# Patient Record
Sex: MALE | Race: WHITE | NOT HISPANIC OR LATINO
[De-identification: names, ages, dates, MRNs, and addresses within clinical notes are randomized per-mention and may not be internally consistent; named-entity substitution may affect disease eponyms.]

---

## 2017-04-12 ENCOUNTER — RESULT REVIEW (OUTPATIENT)
Age: 72
End: 2017-04-12

## 2017-04-13 ENCOUNTER — APPOINTMENT (OUTPATIENT)
Dept: DERMATOLOGY | Facility: CLINIC | Age: 72
End: 2017-04-13

## 2017-10-24 ENCOUNTER — APPOINTMENT (OUTPATIENT)
Dept: DERMATOLOGY | Facility: CLINIC | Age: 72
End: 2017-10-24
Payer: MEDICARE

## 2017-10-24 PROCEDURE — 99214 OFFICE O/P EST MOD 30 MIN: CPT | Mod: 25

## 2017-10-24 PROCEDURE — 17000 DESTRUCT PREMALG LESION: CPT

## 2017-12-27 ENCOUNTER — APPOINTMENT (OUTPATIENT)
Dept: GASTROENTEROLOGY | Facility: CLINIC | Age: 72
End: 2017-12-27
Payer: MEDICARE

## 2017-12-27 VITALS
OXYGEN SATURATION: 98 % | RESPIRATION RATE: 16 BRPM | HEART RATE: 81 BPM | DIASTOLIC BLOOD PRESSURE: 97 MMHG | BODY MASS INDEX: 24.92 KG/M2 | SYSTOLIC BLOOD PRESSURE: 157 MMHG | WEIGHT: 178 LBS | HEIGHT: 71 IN

## 2017-12-27 DIAGNOSIS — Z80.0 FAMILY HISTORY OF MALIGNANT NEOPLASM OF DIGESTIVE ORGANS: ICD-10-CM

## 2017-12-27 DIAGNOSIS — K57.30 DIVERTICULOSIS OF LARGE INTESTINE W/OUT PERFORATION OR ABSCESS W/OUT BLEEDING: ICD-10-CM

## 2017-12-27 PROCEDURE — 99203 OFFICE O/P NEW LOW 30 MIN: CPT

## 2017-12-27 RX ORDER — CICLOPIROX 80 MG/ML
8 SOLUTION TOPICAL
Qty: 7 | Refills: 0 | Status: ACTIVE | COMMUNITY
Start: 2017-10-17

## 2017-12-27 RX ORDER — ASPIRIN 325 MG/1
325 TABLET, FILM COATED ORAL
Refills: 0 | Status: ACTIVE | COMMUNITY

## 2017-12-27 RX ORDER — FIBER
TABLET ORAL
Refills: 0 | Status: ACTIVE | COMMUNITY

## 2017-12-27 RX ORDER — SODIUM SULFATE, POTASSIUM SULFATE, MAGNESIUM SULFATE 17.5; 3.13; 1.6 G/ML; G/ML; G/ML
17.5-3.13-1.6 SOLUTION, CONCENTRATE ORAL
Qty: 1 | Refills: 0 | Status: ACTIVE | COMMUNITY
Start: 2017-12-27 | End: 1900-01-01

## 2018-03-30 ENCOUNTER — OUTPATIENT (OUTPATIENT)
Dept: OUTPATIENT SERVICES | Facility: HOSPITAL | Age: 73
LOS: 1 days | Discharge: HOME | End: 2018-03-30
Payer: MEDICARE

## 2018-03-30 ENCOUNTER — APPOINTMENT (OUTPATIENT)
Dept: GASTROENTEROLOGY | Facility: GI CENTER | Age: 73
End: 2018-03-30
Payer: MEDICARE

## 2018-03-30 ENCOUNTER — RESULT REVIEW (OUTPATIENT)
Age: 73
End: 2018-03-30

## 2018-03-30 DIAGNOSIS — Z86.010 PERSONAL HISTORY OF COLONIC POLYPS: ICD-10-CM

## 2018-03-30 DIAGNOSIS — K57.30 DIVERTICULOSIS OF LARGE INTESTINE W/OUT PERFORATION OR ABSCESS W/OUT BLEEDING: ICD-10-CM

## 2018-03-30 DIAGNOSIS — K57.30 DIVERTICULOSIS OF LARGE INTESTINE WITHOUT PERFORATION OR ABSCESS WITHOUT BLEEDING: ICD-10-CM

## 2018-03-30 DIAGNOSIS — D12.2 BENIGN NEOPLASM OF ASCENDING COLON: ICD-10-CM

## 2018-03-30 DIAGNOSIS — Z85.828 PERSONAL HISTORY OF OTHER MALIGNANT NEOPLASM OF SKIN: ICD-10-CM

## 2018-03-30 DIAGNOSIS — D12.4 BENIGN NEOPLASM OF DESCENDING COLON: ICD-10-CM

## 2018-03-30 PROCEDURE — 45380 COLONOSCOPY AND BIOPSY: CPT | Mod: 59

## 2018-03-30 PROCEDURE — 45385 COLONOSCOPY W/LESION REMOVAL: CPT

## 2018-03-30 PROCEDURE — 45385 COLONOSCOPY W/LESION REMOVAL: CPT | Mod: XS,PT

## 2018-03-30 PROCEDURE — 88305 TISSUE EXAM BY PATHOLOGIST: CPT

## 2018-03-30 PROCEDURE — 45380 COLONOSCOPY AND BIOPSY: CPT | Mod: XS,PT

## 2018-03-30 PROCEDURE — 45388 COLONOSCOPY W/ABLATION: CPT | Mod: PT

## 2018-03-30 PROCEDURE — 88305 TISSUE EXAM BY PATHOLOGIST: CPT | Mod: 26

## 2018-04-03 LAB — SURGICAL PATHOLOGY FINAL REPORT - CH: SIGNIFICANT CHANGE UP

## 2018-04-24 ENCOUNTER — APPOINTMENT (OUTPATIENT)
Dept: DERMATOLOGY | Facility: CLINIC | Age: 73
End: 2018-04-24
Payer: MEDICARE

## 2018-04-24 PROCEDURE — 99214 OFFICE O/P EST MOD 30 MIN: CPT

## 2018-04-25 ENCOUNTER — RESULT REVIEW (OUTPATIENT)
Age: 73
End: 2018-04-25

## 2018-07-27 PROBLEM — Z80.0 FAMILY HISTORY OF COLON CANCER: Status: INACTIVE | Noted: 2017-12-27

## 2018-10-18 ENCOUNTER — APPOINTMENT (OUTPATIENT)
Dept: DERMATOLOGY | Facility: CLINIC | Age: 73
End: 2018-10-18
Payer: MEDICARE

## 2018-10-18 PROCEDURE — 99213 OFFICE O/P EST LOW 20 MIN: CPT

## 2019-04-23 ENCOUNTER — APPOINTMENT (OUTPATIENT)
Dept: DERMATOLOGY | Facility: CLINIC | Age: 74
End: 2019-04-23
Payer: MEDICARE

## 2019-04-23 PROCEDURE — 17110 DESTRUCTION B9 LES UP TO 14: CPT

## 2019-04-23 PROCEDURE — 99213 OFFICE O/P EST LOW 20 MIN: CPT | Mod: 25

## 2019-11-26 ENCOUNTER — APPOINTMENT (OUTPATIENT)
Dept: DERMATOLOGY | Facility: CLINIC | Age: 74
End: 2019-11-26
Payer: MEDICARE

## 2019-11-26 PROCEDURE — 99213 OFFICE O/P EST LOW 20 MIN: CPT | Mod: 25

## 2019-11-26 PROCEDURE — 17000 DESTRUCT PREMALG LESION: CPT

## 2019-11-26 PROCEDURE — 17003 DESTRUCT PREMALG LES 2-14: CPT

## 2019-12-12 ENCOUNTER — RESULT REVIEW (OUTPATIENT)
Age: 74
End: 2019-12-12

## 2019-12-13 ENCOUNTER — APPOINTMENT (OUTPATIENT)
Dept: DERMATOLOGY | Facility: CLINIC | Age: 74
End: 2019-12-13
Payer: MEDICARE

## 2019-12-13 PROCEDURE — 11104 PUNCH BX SKIN SINGLE LESION: CPT

## 2019-12-13 PROCEDURE — 17000 DESTRUCT PREMALG LESION: CPT | Mod: 59

## 2019-12-13 PROCEDURE — 17003 DESTRUCT PREMALG LES 2-14: CPT

## 2019-12-30 ENCOUNTER — APPOINTMENT (OUTPATIENT)
Dept: DERMATOLOGY | Facility: CLINIC | Age: 74
End: 2019-12-30
Payer: MEDICARE

## 2019-12-30 PROCEDURE — 99212 OFFICE O/P EST SF 10 MIN: CPT

## 2020-10-27 ENCOUNTER — APPOINTMENT (OUTPATIENT)
Dept: DERMATOLOGY | Facility: CLINIC | Age: 75
End: 2020-10-27
Payer: MEDICARE

## 2020-10-27 PROCEDURE — 17003 DESTRUCT PREMALG LES 2-14: CPT

## 2020-10-27 PROCEDURE — 17000 DESTRUCT PREMALG LESION: CPT

## 2020-10-27 PROCEDURE — 99213 OFFICE O/P EST LOW 20 MIN: CPT | Mod: 25

## 2020-12-07 ENCOUNTER — RESULT REVIEW (OUTPATIENT)
Age: 75
End: 2020-12-07

## 2020-12-08 ENCOUNTER — APPOINTMENT (OUTPATIENT)
Dept: DERMATOLOGY | Facility: CLINIC | Age: 75
End: 2020-12-08
Payer: MEDICARE

## 2020-12-08 PROCEDURE — 17110 DESTRUCTION B9 LES UP TO 14: CPT

## 2020-12-08 PROCEDURE — 99213 OFFICE O/P EST LOW 20 MIN: CPT | Mod: 25

## 2021-10-07 ENCOUNTER — APPOINTMENT (OUTPATIENT)
Dept: DERMATOLOGY | Facility: CLINIC | Age: 76
End: 2021-10-07
Payer: MEDICARE

## 2021-10-07 ENCOUNTER — RESULT REVIEW (OUTPATIENT)
Age: 76
End: 2021-10-07

## 2021-10-07 PROCEDURE — 17003 DESTRUCT PREMALG LES 2-14: CPT | Mod: 59

## 2021-10-07 PROCEDURE — 17110 DESTRUCTION B9 LES UP TO 14: CPT | Mod: 59

## 2021-10-07 PROCEDURE — 99213 OFFICE O/P EST LOW 20 MIN: CPT | Mod: 25

## 2021-10-07 PROCEDURE — 17262 DSTRJ MAL LES T/A/L 1.1-2.0: CPT

## 2021-10-07 PROCEDURE — 17000 DESTRUCT PREMALG LESION: CPT | Mod: 59

## 2022-11-03 ENCOUNTER — RESULT REVIEW (OUTPATIENT)
Age: 77
End: 2022-11-03

## 2022-11-04 ENCOUNTER — APPOINTMENT (OUTPATIENT)
Dept: DERMATOLOGY | Facility: CLINIC | Age: 77
End: 2022-11-04

## 2022-11-04 PROCEDURE — 17261 DSTRJ MAL LES T/A/L .6-1.0CM: CPT | Mod: 59

## 2022-11-04 PROCEDURE — 99213 OFFICE O/P EST LOW 20 MIN: CPT | Mod: 25

## 2022-11-04 PROCEDURE — 11102 TANGNTL BX SKIN SINGLE LES: CPT | Mod: 59

## 2022-11-04 PROCEDURE — 17281 DSTR MAL LS F/E/E/N/L/M .6-1: CPT

## 2022-11-17 ENCOUNTER — OFFICE (OUTPATIENT)
Dept: URBAN - METROPOLITAN AREA CLINIC 115 | Facility: CLINIC | Age: 77
Setting detail: OPHTHALMOLOGY
End: 2022-11-17
Payer: MEDICARE

## 2022-11-17 DIAGNOSIS — H01.001: ICD-10-CM

## 2022-11-17 DIAGNOSIS — H01.005: ICD-10-CM

## 2022-11-17 DIAGNOSIS — H04.123: ICD-10-CM

## 2022-11-17 DIAGNOSIS — H01.002: ICD-10-CM

## 2022-11-17 DIAGNOSIS — H52.4: ICD-10-CM

## 2022-11-17 DIAGNOSIS — H25.13: ICD-10-CM

## 2022-11-17 DIAGNOSIS — H01.004: ICD-10-CM

## 2022-11-17 PROCEDURE — 92015 DETERMINE REFRACTIVE STATE: CPT | Performed by: OPHTHALMOLOGY

## 2022-11-17 PROCEDURE — 92014 COMPRE OPH EXAM EST PT 1/>: CPT | Performed by: OPHTHALMOLOGY

## 2022-11-17 ASSESSMENT — REFRACTION_CURRENTRX
OS_SPHERE: +2.75
OD_AXIS: 093
OD_CYLINDER: -0.50
OS_OVR_VA: 20/
OD_VPRISM_DIRECTION: SV
OD_OVR_VA: 20/
OD_SPHERE: +4.00
OD_VPRISM_DIRECTION: BF
OS_CYLINDER: -0.75
OD_SPHERE: +4.00
OS_OVR_VA: 20/
OS_VPRISM_DIRECTION: SV
OS_ADD: +2.00
OS_AXIS: 063
OD_VPRISM_DIRECTION: BF
OD_CYLINDER: -1.25
OD_AXIS: 092
OD_CYLINDER: -1.25
OS_OVR_VA: 20/
OS_CYLINDER: -0.75
OS_CYLINDER: -0.75
OD_OVR_VA: 20/
OS_SPHERE: +2.75
OS_AXIS: 070
OD_ADD: +2.00
OS_VPRISM_DIRECTION: BF
OD_OVR_VA: 20/
OD_AXIS: 091
OS_ADD: +2.00
OS_VPRISM_DIRECTION: BF
OD_ADD: +2.00
OS_AXIS: 080
OD_SPHERE: +4.00
OS_SPHERE: +3.75

## 2022-11-17 ASSESSMENT — AXIALLENGTH_DERIVED
OD_AL: 21.6721
OD_AL: 21.6311
OS_AL: 21.7251
OD_AL: 21.3882
OS_AL: 21.4398
OS_AL: 21.6019

## 2022-11-17 ASSESSMENT — REFRACTION_AUTOREFRACTION
OS_AXIS: 079
OS_CYLINDER: -2.00
OD_AXIS: 095
OD_CYLINDER: -2.50
OD_SPHERE: +5.25
OS_SPHERE: +4.25

## 2022-11-17 ASSESSMENT — REFRACTION_MANIFEST
OS_VA1: 20/20
OS_VA1: 20/25
OD_CYLINDER: -0.75
OD_SPHERE: +3.75
OS_CYLINDER: -1.00
OD_ADD: +2.00
OD_ADD: +2.50
OD_SPHERE: +3.50
OS_ADD: +2.00
OD_VA1: 20/25
OS_AXIS: 070
OS_SPHERE: +2.75
OS_CYLINDER: -0.75
OU_VA: 20/25
OD_AXIS: 055
OD_VA1: 20/20
OS_AXIS: 065
OU_VA: 20/20
OS_ADD: +2.50
OD_CYLINDER: -1.00
OD_AXIS: 090
OS_SPHERE: +3.25

## 2022-11-17 ASSESSMENT — VISUAL ACUITY
OD_BCVA: 20/25+1
OS_BCVA: 20/25

## 2022-11-17 ASSESSMENT — SPHEQUIV_DERIVED
OS_SPHEQUIV: 3.25
OS_SPHEQUIV: 2.75
OD_SPHEQUIV: 4
OD_SPHEQUIV: 3.25
OD_SPHEQUIV: 3.125
OS_SPHEQUIV: 2.375

## 2022-11-17 ASSESSMENT — KERATOMETRY
METHOD_AUTO_MANUAL: AUTO
OD_AXISANGLE_DEGREES: 004
OS_K1POWER_DIOPTERS: 45.75
OS_K2POWER_DIOPTERS: 47.25
OS_AXISANGLE_DEGREES: 174
OD_K2POWER_DIOPTERS: 46.75
OD_K1POWER_DIOPTERS: 45.00

## 2022-11-17 ASSESSMENT — TONOMETRY
OS_IOP_MMHG: 20
OD_IOP_MMHG: 18

## 2022-11-17 ASSESSMENT — LID EXAM ASSESSMENTS
OD_BLEPHARITIS: RLL RUL T
OS_BLEPHARITIS: LLL LUL T

## 2022-11-17 ASSESSMENT — CONFRONTATIONAL VISUAL FIELD TEST (CVF)
OD_FINDINGS: FULL
OS_FINDINGS: FULL

## 2022-12-01 ENCOUNTER — RESULT REVIEW (OUTPATIENT)
Age: 77
End: 2022-12-01

## 2022-12-02 ENCOUNTER — APPOINTMENT (OUTPATIENT)
Dept: DERMATOLOGY | Facility: CLINIC | Age: 77
End: 2022-12-02

## 2022-12-02 DIAGNOSIS — D48.5 NEOPLASM OF UNCERTAIN BEHAVIOR OF SKIN: ICD-10-CM

## 2022-12-02 DIAGNOSIS — C43.59 MALIGNANT MELANOMA OF OTHER PART OF TRUNK: ICD-10-CM

## 2022-12-02 PROCEDURE — 12034 INTMD RPR S/TR/EXT 7.6-12.5: CPT

## 2022-12-02 PROCEDURE — 11604 EXC TR-EXT MAL+MARG 3.1-4 CM: CPT

## 2022-12-02 RX ORDER — MUPIROCIN 20 MG/G
2 OINTMENT TOPICAL TWICE DAILY
Qty: 1 | Refills: 0 | Status: ACTIVE | COMMUNITY
Start: 2022-12-02 | End: 1900-01-01

## 2022-12-02 RX ORDER — CEPHALEXIN 500 MG/1
500 CAPSULE ORAL
Qty: 14 | Refills: 0 | Status: ACTIVE | COMMUNITY
Start: 2022-12-02 | End: 1900-01-01

## 2022-12-03 PROBLEM — D48.5 NEOPLASM OF UNCERTAIN BEHAVIOR OF SKIN OF BACK: Status: ACTIVE | Noted: 2022-12-02

## 2022-12-11 ENCOUNTER — TRANSCRIPTION ENCOUNTER (OUTPATIENT)
Age: 77
End: 2022-12-11

## 2022-12-15 ENCOUNTER — NON-APPOINTMENT (OUTPATIENT)
Age: 77
End: 2022-12-15

## 2022-12-27 ENCOUNTER — APPOINTMENT (OUTPATIENT)
Dept: DERMATOLOGY | Facility: CLINIC | Age: 77
End: 2022-12-27

## 2023-05-06 ENCOUNTER — NON-APPOINTMENT (OUTPATIENT)
Age: 78
End: 2023-05-06

## 2023-05-08 ENCOUNTER — APPOINTMENT (OUTPATIENT)
Dept: DERMATOLOGY | Facility: CLINIC | Age: 78
End: 2023-05-08
Payer: MEDICARE

## 2023-05-08 PROCEDURE — 17000 DESTRUCT PREMALG LESION: CPT

## 2023-05-08 PROCEDURE — 99214 OFFICE O/P EST MOD 30 MIN: CPT | Mod: 25

## 2023-07-18 ENCOUNTER — APPOINTMENT (OUTPATIENT)
Dept: DERMATOLOGY | Facility: CLINIC | Age: 78
End: 2023-07-18
Payer: MEDICARE

## 2023-07-18 ENCOUNTER — RESULT REVIEW (OUTPATIENT)
Age: 78
End: 2023-07-18

## 2023-07-18 PROCEDURE — 11103 TANGNTL BX SKIN EA SEP/ADDL: CPT | Mod: 59

## 2023-07-18 PROCEDURE — 99213 OFFICE O/P EST LOW 20 MIN: CPT | Mod: 25

## 2023-07-18 PROCEDURE — 17003 DESTRUCT PREMALG LES 2-14: CPT

## 2023-07-18 PROCEDURE — 17000 DESTRUCT PREMALG LESION: CPT | Mod: 59

## 2023-07-18 PROCEDURE — 11102 TANGNTL BX SKIN SINGLE LES: CPT

## 2023-11-15 ENCOUNTER — RESULT REVIEW (OUTPATIENT)
Age: 78
End: 2023-11-15

## 2023-11-16 ENCOUNTER — APPOINTMENT (OUTPATIENT)
Dept: DERMATOLOGY | Facility: CLINIC | Age: 78
End: 2023-11-16
Payer: MEDICARE

## 2023-11-16 PROCEDURE — 99213 OFFICE O/P EST LOW 20 MIN: CPT | Mod: 25

## 2023-11-16 PROCEDURE — 17110 DESTRUCTION B9 LES UP TO 14: CPT | Mod: 59

## 2023-11-16 PROCEDURE — 17281 DSTR MAL LS F/E/E/N/L/M .6-1: CPT

## 2023-12-02 ENCOUNTER — OFFICE (OUTPATIENT)
Dept: URBAN - METROPOLITAN AREA CLINIC 115 | Facility: CLINIC | Age: 78
Setting detail: OPHTHALMOLOGY
End: 2023-12-02
Payer: MEDICARE

## 2023-12-02 DIAGNOSIS — H01.001: ICD-10-CM

## 2023-12-02 DIAGNOSIS — H01.004: ICD-10-CM

## 2023-12-02 DIAGNOSIS — H04.123: ICD-10-CM

## 2023-12-02 DIAGNOSIS — H01.002: ICD-10-CM

## 2023-12-02 DIAGNOSIS — H52.7: ICD-10-CM

## 2023-12-02 DIAGNOSIS — H01.005: ICD-10-CM

## 2023-12-02 DIAGNOSIS — H25.13: ICD-10-CM

## 2023-12-02 PROCEDURE — 92014 COMPRE OPH EXAM EST PT 1/>: CPT | Performed by: OPHTHALMOLOGY

## 2023-12-02 PROCEDURE — 92015 DETERMINE REFRACTIVE STATE: CPT | Performed by: OPHTHALMOLOGY

## 2023-12-02 ASSESSMENT — REFRACTION_CURRENTRX
OS_VPRISM_DIRECTION: BF
OS_CYLINDER: -0.75
OD_SPHERE: +4.00
OS_AXIS: 069
OS_ADD: +2.00
OS_ADD: +2.50
OS_VPRISM_DIRECTION: BF
OS_AXIS: 080
OD_CYLINDER: -1.25
OD_VPRISM_DIRECTION: SV
OS_OVR_VA: 20/
OD_AXIS: 061
OD_SPHERE: +4.00
OS_OVR_VA: 20/
OD_OVR_VA: 20/
OD_OVR_VA: 20/
OD_CYLINDER: -0.75
OS_SPHERE: +3.75
OS_SPHERE: +2.75
OD_CYLINDER: -0.50
OD_OVR_VA: 20/
OD_SPHERE: +3.50
OD_AXIS: 091
OD_AXIS: 092
OS_AXIS: 063
OD_ADD: +2.50
OD_VPRISM_DIRECTION: BF
OD_ADD: +2.00
OS_VPRISM_DIRECTION: SV
OD_VPRISM_DIRECTION: BF
OS_CYLINDER: -1.25
OS_CYLINDER: -0.75
OS_SPHERE: +3.50
OS_OVR_VA: 20/

## 2023-12-02 ASSESSMENT — SPHEQUIV_DERIVED
OS_SPHEQUIV: 2.75
OD_SPHEQUIV: 4.625
OD_SPHEQUIV: 3.125
OS_SPHEQUIV: 3.375
OD_SPHEQUIV: 3.625
OS_SPHEQUIV: 2.875

## 2023-12-02 ASSESSMENT — REFRACTION_MANIFEST
OD_SPHERE: +4.50
OS_CYLINDER: -1.25
OD_CYLINDER: -1.75
OD_VA1: 20/25
OD_CYLINDER: -0.75
OS_ADD: +2.50
OS_CYLINDER: -1.00
OD_VA1: 20/25
OS_VA1: 20/20
OS_VA1: 20/25
OD_SPHERE: +3.50
OU_VA: 20/25
OS_AXIS: 70
OS_AXIS: 065
OS_SPHERE: +3.25
OS_ADD: +2.50
OD_ADD: +2.50
OD_AXIS: 055
OD_AXIS: 090
OS_SPHERE: +3.50
OD_ADD: +2.50

## 2023-12-02 ASSESSMENT — REFRACTION_AUTOREFRACTION
OD_AXIS: 089
OS_SPHERE: +4.50
OD_CYLINDER: -2.75
OS_AXIS: 071
OD_SPHERE: +6.00
OS_CYLINDER: -2.25

## 2023-12-02 ASSESSMENT — LID EXAM ASSESSMENTS
OS_BLEPHARITIS: LLL LUL T
OD_BLEPHARITIS: RLL RUL T

## 2023-12-02 ASSESSMENT — CONFRONTATIONAL VISUAL FIELD TEST (CVF)
OS_FINDINGS: FULL
OD_FINDINGS: FULL

## 2024-05-09 ENCOUNTER — RESULT REVIEW (OUTPATIENT)
Age: 79
End: 2024-05-09

## 2024-05-09 ENCOUNTER — APPOINTMENT (OUTPATIENT)
Dept: DERMATOLOGY | Facility: CLINIC | Age: 79
End: 2024-05-09
Payer: MEDICARE

## 2024-05-09 PROCEDURE — 11102 TANGNTL BX SKIN SINGLE LES: CPT | Mod: 59

## 2024-05-09 PROCEDURE — 99213 OFFICE O/P EST LOW 20 MIN: CPT | Mod: 25

## 2024-05-09 PROCEDURE — 17271 DSTR MAL LES S/N/H/F/G 0.6-1: CPT

## 2024-06-05 ENCOUNTER — APPOINTMENT (OUTPATIENT)
Dept: DERMATOLOGY | Facility: CLINIC | Age: 79
End: 2024-06-05
Payer: MEDICARE

## 2024-06-05 PROCEDURE — 17311 MOHS 1 STAGE H/N/HF/G: CPT

## 2024-06-05 RX ORDER — MUPIROCIN 20 MG/G
2 OINTMENT TOPICAL TWICE DAILY
Qty: 1 | Refills: 6 | Status: ACTIVE | COMMUNITY
Start: 2024-06-05 | End: 1900-01-01

## 2024-06-05 RX ORDER — CEPHALEXIN 500 MG/1
500 CAPSULE ORAL 3 TIMES DAILY
Qty: 21 | Refills: 0 | Status: ACTIVE | COMMUNITY
Start: 2024-06-05 | End: 1900-01-01

## 2024-06-06 NOTE — ASSESSMENT
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   Thank you for this Mohs surgery referral.   Reason MD Valerie Physician, Dermatology & Dermatologic Surgery [facility]

## 2024-06-06 NOTE — PHYSICAL EXAM
[Alert] : alert [Oriented x 3] : ~L oriented x 3 [Well Nourished] : well nourished [Conjunctiva Non-injected] : conjunctiva non-injected [No Visual Lymphadenopathy] : no visual  lymphadenopathy [No Clubbing] : no clubbing [No Edema] : no edema [No Bromhidrosis] : no bromhidrosis [No Chromhidrosis] : no chromhidrosis [Hair] : Hair [Scalp] : Scalp [Face] : Face [Nose] : Nose [Eyelids] : Eyelids [Ears] : Ears [Neck] : Neck [R Arm] : R Arm [L Arm] : L Arm [Nodes] : Nodes [FreeTextEntry3] : -right dorsal hand with 1.8 cm x 1.4 cm pink scaly plaque -no axillary adenopathy

## 2024-06-06 NOTE — HISTORY OF PRESENT ILLNESS
[FreeTextEntry1] : SCC Right Hand Third Webspace [de-identified] : Mohs Surgery Consultation  06/05/2024   Referred by: Dr. Castro  We had the pleasure of seeing your patient in consultation for Mohs Micrographic Surgery.  Mr. SEAN JOE is a 79 year old M who presents for evaluation of a recently biopsied SCC Right hand third webspace. Had been present as a growing keratotic bump x mos prior to biopsy. No treatment to date.   Pertinent positives noted below  History of HIV or hepatitis: No Blood thinners: none Antibiotic Prophylaxis: None  Medical implants: None  Social History: no tobacco or alcohol   The patient's review of systems questionnaire was reviewed. Education needs were identified. There were no barriers to learning.  Mohs surgery consultation for SCC Right Hand 3rd Webspace  -- I explained the treatment options of topical immunomodulatory or chemotherapy, electrodessication and curettage, radiation therapy, excision and Mohs surgery.  I recommended Mohs surgery due to the tumor type, location, and ill-defined nature of cancer.   Mohs Appropriate Use Criteria (AUC) Score: 8  I explained that following extirpation there will be a full thickness defect of the involved area. The reconstructive options will be based on the defect size and surrounding tissue laxity of the involved area. Primary closure is only possible for smaller defects. For larger defects, local tissue rearrangement or skin grafting may be necessary. Risks following layered primary closure or local tissue rearrangement include wound dehiscence, contour irregularity, bleeding, infection, and paresthesia (nerve damage including sensory deficit or motor weakness). Risks following skin grafting include wound dehiscence, skin graft nonadherence (partial or complete), contour irregularity, bleeding, infection, paresthesia, and donor site complications. I explained that the patient will need to abstain from physical activity for 1-2 weeks following the surgery, that they would heal with a scar, and also discussed the chances of infection, bleeding, potential sensory or motor nerve damage, and recurrence.  The patient indicated that s/he understood the risks and wished to proceed today -- In particular, for reconstruction we discussed second intent healing  Thank you for this Mohs surgery referral. We recommended that Mr. SEAN JOE follow up with His referring dermatologist for routine skin exams following surgery.

## 2024-06-06 NOTE — PROCEDURE
[TextEntry] : Mohs Surgery Procedure Note   A surgical time out was taken to confirm the patient's correct identity and the correct site. The operative site was identified by the patient and surgical team prior to surgery and the patient agreed to proceed with the surgical site which was marked prior to anesthesia infiltration.   Mohs Micrographic Surgery Report Date Collected: 06/05/2024 Date Received: Same as above Date Verified: Same as above Attending Surgeon: Shona Padilla MD Assistants: José Alonzo RN, Vikki Rogers MA Procedure#:  Diagnosis: SCC Location: right hand 3rd webspace Pre-op Size: 1.8 cm x 1.4 cm  Post-Operative Final Defect Size: 2.5 cm x 2.0 cm  Stages (number of pieces per stage):1 (2/1) Pathology, if tumor noted in stages: Debulk with atypical keratinocytes and keratin pearls consistent with SCC. Stage I with Sparse perivascular lymphocytic infiltrate and no evidence of residual carcinoma.  Indications for procedure: Ill-defined tumor margins, high-priority anatomic location for preservation of normal tissue, aggressive histologic nature of the tumor Repair type: N/A (second intent healing) Total volume of anesthesia: 12 ml    Mohs Procedure Report:   The patient was escorted to the outpatient surgical operatory. The proposed Mohs procedure and reconstruction options were discussed with the patient. The risks, benefits, and alternatives were discussed and the patient signed a written consent form. A time out was taken to confirm the patient's identity and the exact location of the skin cancer. After the patient was placed in a recumbent position, the surgical site was cleaned with alcohol and either Betadine (for eyes and ears) or chlorhexidine gluconate, draped, and infiltrated with 0.5%  lidocaine with 1:200,000 epinephrine local anesthetic. A sterile surgical marking pen was used to outline a thin margin of normal-appearing skin around the tumor. A beveled incision was then made using a scalpel. Small orienting nicks were made on the specimen and the surrounding skin. The tissue was then sharply dissected from the surrounding skin. Hemostasis was maintained with the electrosurgical unit and/or pressure. A temporary dressing was placed on the surgical defect until the frozen section slides were interpreted. The oriented specimen was placed in a Rachel dish and transported to the Mohs laboratory. For each stage of the procedure, a visual representation of the specimen was drawn on a Mohs map. This map graphically depicts the specimen's two-dimensional appearance, orientation, and tissue preparation, which consists of dividing the specimen and applying tissue dyes. Because the deep and peripheral portions of the tissue are then embedded in the same geometric plane, the map also represents an oriented scale drawing of the resulting histologic sections. The Mohs technician then prepared frozen section slides using standard techniques. The slides were stained with hematoxylin and eosin, and cover slips were applied. The frozen section slides were then examined under the microscope. If tumor was found, it was localized on the map. The orienting nicks on the original specimen corresponded to similar nicks on the surgical defect so areas of identified tumor could be mapped back to the patient and resected. Additional layers of removed skin were then processed as indicated above. This iterative process continued as applicable until no tumor was observed microscopically. At this stage, the Mohs resection was complete.   Second Intention Healing After the surgical procedure was completed, the patient was brought back to the minor surgery operatory. Various reconstructive modalities were discussed with the patient. Second intention healing was selected as the best option. A pressure dressing composed of Vaseline ointment, Telfa, and sterile gauze and was securely taped into place. The patient was given complete verbal and written wound care instructions and was asked to follow up for a wound check as indicated. The patient ambulated from the minor surgery operatory without problems.

## 2024-06-10 ENCOUNTER — APPOINTMENT (OUTPATIENT)
Dept: DERMATOLOGY | Facility: CLINIC | Age: 79
End: 2024-06-10

## 2024-06-10 RX ORDER — CLINDAMYCIN HYDROCHLORIDE 300 MG/1
300 CAPSULE ORAL EVERY 6 HOURS
Qty: 40 | Refills: 0 | Status: ACTIVE | COMMUNITY
Start: 2024-06-10 | End: 1900-01-01

## 2024-06-10 NOTE — ASSESSMENT
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 SED. -continue mupiroin 2% BID -discussed concerning signs.sx to seek ED care for including worsening pain/swelling/fever -RTC in 1 week for wound check -- f/u for routine skin exams as previously recommended by His referring dermatologist.   A total of 35 min spent on this patient encounter, >50% in face to face counseling by the attending physician  Thank you for this Mohs surgery referral.   Reason MD Valerie Physician, Dermatology & Dermatologic Surgery [facility]

## 2024-06-10 NOTE — HISTORY OF PRESENT ILLNESS
[FreeTextEntry1] : Wound Check s/p Mohs and second intent healing for SCC Right Hand Third Webspace 6/5/24 [de-identified] : Mohs Surgery Consultation  06/10/2024    Mr. SEAN JOE is a 79 year old M who presents for follow up evaluation s/p Mohs as above. He was RXd for Keflex 500 mg TID x 1 week at time of procedure and has been taking it. Notes that in the last 2 days has developed worsening pain and swelling, no fevers.   ROS: Patient denies fever, chills, night sweats, unintentional weight loss or other constitutional symptoms

## 2024-06-10 NOTE — PHYSICAL EXAM
[Alert] : alert [Oriented x 3] : ~L oriented x 3 [Well Nourished] : well nourished [Conjunctiva Non-injected] : conjunctiva non-injected [No Visual Lymphadenopathy] : no visual  lymphadenopathy [No Clubbing] : no clubbing [No Edema] : no edema [No Bromhidrosis] : no bromhidrosis [No Chromhidrosis] : no chromhidrosis [Hair] : Hair [Scalp] : Scalp [Face] : Face [Nose] : Nose [Eyelids] : Eyelids [Ears] : Ears [Neck] : Neck [R Arm] : R Arm [L Arm] : L Arm [Nodes] : Nodes [FreeTextEntry3] : -right dorsal hand with significant edema and some tenderness to palpation, normal range of motion of fingers  -slight exudate sent for culture today from 2 cm healing surgical wound

## 2024-06-17 ENCOUNTER — APPOINTMENT (OUTPATIENT)
Dept: DERMATOLOGY | Facility: CLINIC | Age: 79
End: 2024-06-17
Payer: MEDICARE

## 2024-06-17 PROCEDURE — 99213 OFFICE O/P EST LOW 20 MIN: CPT

## 2024-06-17 RX ORDER — MUPIROCIN 20 MG/G
2 OINTMENT TOPICAL TWICE DAILY
Qty: 1 | Refills: 6 | Status: ACTIVE | COMMUNITY
Start: 2024-06-17 | End: 1900-01-01

## 2024-06-18 NOTE — ASSESSMENT
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 SED. -continue mupiroin 2% BID -discussed concerning signs.sx to seek ED care for including worsening pain/swelling/fever -RTC in 1 week for wound check -- f/u for routine skin exams as previously recommended by His referring dermatologist.   Thank you for this Mohs surgery referral.   Shona Padilla MD Physician, Dermatology & Dermatologic Surgery [facility]

## 2024-06-18 NOTE — HISTORY OF PRESENT ILLNESS
[de-identified] : 6/17/2024   Mr. SEAN JOE is a 79 year old M who presents for follow up evaluation s/p Mohs as above. he was seen a week ago for concern of wound infection and switched from postoperative Keflex to Clindamycin 300 mg QID, tolerating well. He has 3 more days of the medication. A culture was sent and is pending.  ROS: Patient denies fever, chills, night sweats, unintentional weight loss or other constitutional symptoms   [FreeTextEntry1] : Wound Check s/p Mohs and second intent healing for SCC Right Hand Third Webspace 6/5/24

## 2024-06-18 NOTE — PHYSICAL EXAM
[Alert] : alert [Oriented x 3] : ~L oriented x 3 [Well Nourished] : well nourished [Conjunctiva Non-injected] : conjunctiva non-injected [No Visual Lymphadenopathy] : no visual  lymphadenopathy [No Clubbing] : no clubbing [No Edema] : no edema [No Bromhidrosis] : no bromhidrosis [No Chromhidrosis] : no chromhidrosis [Hair] : Hair [Scalp] : Scalp [Face] : Face [Nose] : Nose [Eyelids] : Eyelids [Ears] : Ears [Neck] : Neck [R Arm] : R Arm [L Arm] : L Arm [Nodes] : Nodes [FreeTextEntry3] : -right dorsal hand with decreased redness, swelling and tenderness compared to prior -2 cm wound with granulation

## 2024-06-24 ENCOUNTER — APPOINTMENT (OUTPATIENT)
Dept: DERMATOLOGY | Facility: CLINIC | Age: 79
End: 2024-06-24

## 2024-06-25 ENCOUNTER — APPOINTMENT (OUTPATIENT)
Dept: DERMATOLOGY | Facility: CLINIC | Age: 79
End: 2024-06-25
Payer: MEDICARE

## 2024-06-25 DIAGNOSIS — T81.49XA INFECTION FOLLOWING A PROCEDURE, OTHER SURGICAL SITE, INITIAL ENCOUNTER: ICD-10-CM

## 2024-06-25 DIAGNOSIS — C44.622 SQUAMOUS CELL CARCINOMA OF SKIN OF RIGHT UPPER LIMB, INCLUDING SHOULDER: ICD-10-CM

## 2024-06-25 PROCEDURE — 99213 OFFICE O/P EST LOW 20 MIN: CPT

## 2024-07-08 ENCOUNTER — APPOINTMENT (OUTPATIENT)
Dept: DERMATOLOGY | Facility: CLINIC | Age: 79
End: 2024-07-08
Payer: MEDICARE

## 2024-07-08 DIAGNOSIS — C44.622 SQUAMOUS CELL CARCINOMA OF SKIN OF RIGHT UPPER LIMB, INCLUDING SHOULDER: ICD-10-CM

## 2024-07-08 PROCEDURE — 99213 OFFICE O/P EST LOW 20 MIN: CPT

## 2024-09-10 ENCOUNTER — RESULT REVIEW (OUTPATIENT)
Age: 79
End: 2024-09-10

## 2024-09-10 ENCOUNTER — APPOINTMENT (OUTPATIENT)
Dept: DERMATOLOGY | Facility: CLINIC | Age: 79
End: 2024-09-10
Payer: MEDICARE

## 2024-09-10 PROCEDURE — 17003 DESTRUCT PREMALG LES 2-14: CPT | Mod: 59

## 2024-09-10 PROCEDURE — 99213 OFFICE O/P EST LOW 20 MIN: CPT | Mod: 25

## 2024-09-10 PROCEDURE — 17000 DESTRUCT PREMALG LESION: CPT | Mod: 59

## 2024-09-10 PROCEDURE — 11102 TANGNTL BX SKIN SINGLE LES: CPT

## 2024-12-03 ENCOUNTER — OFFICE (OUTPATIENT)
Dept: URBAN - METROPOLITAN AREA CLINIC 115 | Facility: CLINIC | Age: 79
Setting detail: OPHTHALMOLOGY
End: 2024-12-03
Payer: MEDICARE

## 2024-12-03 DIAGNOSIS — H01.002: ICD-10-CM

## 2024-12-03 DIAGNOSIS — H01.005: ICD-10-CM

## 2024-12-03 DIAGNOSIS — H01.004: ICD-10-CM

## 2024-12-03 DIAGNOSIS — H52.7: ICD-10-CM

## 2024-12-03 DIAGNOSIS — H04.123: ICD-10-CM

## 2024-12-03 DIAGNOSIS — H25.13: ICD-10-CM

## 2024-12-03 DIAGNOSIS — H01.001: ICD-10-CM

## 2024-12-03 PROCEDURE — 92015 DETERMINE REFRACTIVE STATE: CPT | Performed by: OPHTHALMOLOGY

## 2024-12-03 PROCEDURE — 92014 COMPRE OPH EXAM EST PT 1/>: CPT | Performed by: OPHTHALMOLOGY

## 2024-12-03 ASSESSMENT — KERATOMETRY
OD_K2POWER_DIOPTERS: 46.75
OS_AXISANGLE_DEGREES: 174
OS_K1POWER_DIOPTERS: 45.75
OS_K2POWER_DIOPTERS: 47.25
OD_AXISANGLE_DEGREES: 004
METHOD_AUTO_MANUAL: AUTO
OD_K1POWER_DIOPTERS: 45.00

## 2024-12-03 ASSESSMENT — LID EXAM ASSESSMENTS
OS_BLEPHARITIS: LLL LUL T
OD_BLEPHARITIS: RLL RUL T

## 2024-12-03 ASSESSMENT — REFRACTION_MANIFEST
OS_ADD: +2.50
OS_VA1: 20/20
OD_AXIS: 055
OS_CYLINDER: -1.25
OD_AXIS: 090
OS_CYLINDER: -1.75
OS_SPHERE: +3.25
OS_CYLINDER: -1.00
OD_ADD: +2.50
OD_ADD: +2.50
OD_SPHERE: +4.50
OD_VA1: 20/25
OS_SPHERE: +3.50
OD_ADD: +2.50
OD_AXIS: 090
OS_SPHERE: +4.00
OD_SPHERE: +4.00
OS_ADD: +2.50
OS_VA1: 20/25
OD_CYLINDER: -0.75
OS_AXIS: 070
OS_AXIS: 065
OD_CYLINDER: -1.75
OD_VA1: 20/25
OS_VA1: 20/20
OD_SPHERE: +3.50
OD_VA1: 20/25-
OS_AXIS: 70
OS_ADD: +2.50
OU_VA: 20/25
OD_CYLINDER: -0.75

## 2024-12-03 ASSESSMENT — REFRACTION_CURRENTRX
OS_SPHERE: +3.50
OS_AXIS: 067
OD_ADD: +2.25
OS_SPHERE: +2.75
OD_SPHERE: +4.00
OD_CYLINDER: -0.75
OD_ADD: +2.50
OD_AXIS: 061
OS_AXIS: 063
OS_AXIS: 069
OD_CYLINDER: -0.50
OS_ADD: +2.00
OD_VPRISM_DIRECTION: PROGS
OS_VPRISM_DIRECTION: PROGS
OS_OVR_VA: 20/
OS_CYLINDER: -1.25
OD_CYLINDER: -1.25
OS_VPRISM_DIRECTION: BF
OS_ADD: +2.50
OS_CYLINDER: -0.75
OD_CYLINDER: -1.50
OD_OVR_VA: 20/
OD_SPHERE: +4.00
OD_VPRISM_DIRECTION: SV
OD_AXIS: 092
OD_AXIS: 093
OD_VPRISM_DIRECTION: BF
OD_VPRISM_DIRECTION: BF
OS_ADD: +2.25
OS_CYLINDER: -0.75
OS_VPRISM_DIRECTION: SV
OS_SPHERE: +3.75
OS_CYLINDER: -1.25
OS_VPRISM_DIRECTION: BF
OD_OVR_VA: 20/
OD_ADD: +2.00
OS_AXIS: 080
OD_AXIS: 091
OS_SPHERE: +3.50
OS_OVR_VA: 20/
OS_OVR_VA: 20/
OD_OVR_VA: 20/
OD_SPHERE: +3.50
OD_SPHERE: +4.75

## 2024-12-03 ASSESSMENT — REFRACTION_AUTOREFRACTION
OS_CYLINDER: -1.75
OD_AXIS: 092
OS_SPHERE: +4.25
OD_CYLINDER: -2.75
OS_AXIS: 074
OD_SPHERE: +5.75

## 2024-12-03 ASSESSMENT — CONFRONTATIONAL VISUAL FIELD TEST (CVF)
OS_FINDINGS: FULL
OD_FINDINGS: FULL

## 2024-12-03 ASSESSMENT — TONOMETRY
OD_IOP_MMHG: 15
OS_IOP_MMHG: 16

## 2024-12-03 ASSESSMENT — VISUAL ACUITY
OD_BCVA: 20/25
OS_BCVA: 20/30-

## 2025-07-01 ENCOUNTER — APPOINTMENT (OUTPATIENT)
Dept: DERMATOLOGY | Facility: CLINIC | Age: 80
End: 2025-07-01
Payer: MEDICARE

## 2025-07-01 ENCOUNTER — RESULT REVIEW (OUTPATIENT)
Age: 80
End: 2025-07-01

## 2025-07-01 PROCEDURE — 11102 TANGNTL BX SKIN SINGLE LES: CPT

## 2025-07-01 PROCEDURE — 99213 OFFICE O/P EST LOW 20 MIN: CPT | Mod: 25
